# Patient Record
Sex: MALE | Race: WHITE | NOT HISPANIC OR LATINO | URBAN - METROPOLITAN AREA
[De-identification: names, ages, dates, MRNs, and addresses within clinical notes are randomized per-mention and may not be internally consistent; named-entity substitution may affect disease eponyms.]

---

## 2017-03-30 ENCOUNTER — IMPORTED ENCOUNTER (OUTPATIENT)
Dept: URBAN - METROPOLITAN AREA CLINIC 50 | Facility: CLINIC | Age: 77
End: 2017-03-30

## 2017-04-03 ENCOUNTER — IMPORTED ENCOUNTER (OUTPATIENT)
Dept: URBAN - METROPOLITAN AREA CLINIC 50 | Facility: CLINIC | Age: 77
End: 2017-04-03

## 2018-08-20 ENCOUNTER — IMPORTED ENCOUNTER (OUTPATIENT)
Dept: URBAN - METROPOLITAN AREA CLINIC 50 | Facility: CLINIC | Age: 78
End: 2018-08-20

## 2019-08-23 ENCOUNTER — IMPORTED ENCOUNTER (OUTPATIENT)
Dept: URBAN - METROPOLITAN AREA CLINIC 50 | Facility: CLINIC | Age: 79
End: 2019-08-23

## 2020-04-17 ENCOUNTER — IMPORTED ENCOUNTER (OUTPATIENT)
Dept: URBAN - METROPOLITAN AREA CLINIC 50 | Facility: CLINIC | Age: 80
End: 2020-04-17

## 2020-06-01 ENCOUNTER — IMPORTED ENCOUNTER (OUTPATIENT)
Dept: URBAN - METROPOLITAN AREA CLINIC 50 | Facility: CLINIC | Age: 80
End: 2020-06-01

## 2020-06-05 ENCOUNTER — IMPORTED ENCOUNTER (OUTPATIENT)
Dept: URBAN - METROPOLITAN AREA CLINIC 50 | Facility: CLINIC | Age: 80
End: 2020-06-05

## 2020-06-26 ENCOUNTER — IMPORTED ENCOUNTER (OUTPATIENT)
Dept: URBAN - METROPOLITAN AREA CLINIC 50 | Facility: CLINIC | Age: 80
End: 2020-06-26

## 2020-07-01 ENCOUNTER — IMPORTED ENCOUNTER (OUTPATIENT)
Dept: URBAN - METROPOLITAN AREA CLINIC 50 | Facility: CLINIC | Age: 80
End: 2020-07-01

## 2020-07-10 ENCOUNTER — IMPORTED ENCOUNTER (OUTPATIENT)
Dept: URBAN - METROPOLITAN AREA CLINIC 50 | Facility: CLINIC | Age: 80
End: 2020-07-10

## 2020-07-15 ENCOUNTER — IMPORTED ENCOUNTER (OUTPATIENT)
Dept: URBAN - METROPOLITAN AREA CLINIC 50 | Facility: CLINIC | Age: 80
End: 2020-07-15

## 2020-07-15 NOTE — PATIENT DISCUSSION
"""S/P IOL OS: Sensar [id number] 21 +Omidria. Continue post operative instructions and drops per schedule.  """

## 2020-07-24 ENCOUNTER — IMPORTED ENCOUNTER (OUTPATIENT)
Dept: URBAN - METROPOLITAN AREA CLINIC 50 | Facility: CLINIC | Age: 80
End: 2020-07-24

## 2020-08-28 ENCOUNTER — IMPORTED ENCOUNTER (OUTPATIENT)
Dept: URBAN - METROPOLITAN AREA CLINIC 50 | Facility: CLINIC | Age: 80
End: 2020-08-28

## 2020-12-04 ENCOUNTER — IMPORTED ENCOUNTER (OUTPATIENT)
Dept: URBAN - METROPOLITAN AREA CLINIC 50 | Facility: CLINIC | Age: 80
End: 2020-12-04

## 2021-04-18 ASSESSMENT — VISUAL ACUITY
OS_CC: J1+
OD_CC: 20/50
OS_OTHER: 20/40. 20/40.
OD_CC: J1+
OS_OTHER: 20/30. 20/40.
OD_OTHER: 20/60. 20/100.
OS_CC: 20/30
OS_SC: 20/25-
OS_CC: J1
OD_BAT: 20/60
OS_SC: 20/25-
OD_SC: 20/30-+
OS_PH: 20/25
OD_OTHER: 20/70.
OS_OTHER: 20/30.
OD_OTHER: 20/70. 20/400.
OD_CC: 20/20
OS_CC: 20/25-
OD_CC: 20/60
OD_CC: J1+@ 15 IN
OS_BAT: 20/30
OD_BAT: 20/50
OD_SC: 20/40
OS_SC: 20/30+2
OS_CC: 20/30
OD_BAT: 20/70
OD_BAT: 20/60
OS_CC: 20/20
OS_BAT: 20/30
OS_BAT: 20/40
OS_BAT: 20/40
OD_BAT: 20/70
OD_PH: 20/30
OS_OTHER: 20/40. 20/40.
OD_OTHER: 20/50. 20/60.
OD_OTHER: 20/60. 20/100.
OS_CC: 20/20
OD_OTHER: 20/60. 20/100.
OD_CC: 20/60
OD_CC: 20/40-
OD_SC: 20/25-
OS_CC: J1+@ 15 IN
OS_BAT: 20/40
OS_CC: 20/30
OD_CC: 20/40
OD_SC: 20/100
OS_OTHER: 20/40. 20/50.
OS_CC: 20/70
OS_OTHER: 20/30. 20/50.
OD_BAT: 20/60
OS_OTHER: 20/40. 20/50.
OS_BAT: 20/40
OD_PH: 20/40
OD_CC: J1

## 2021-04-18 ASSESSMENT — TONOMETRY
OD_IOP_MMHG: 12
OS_IOP_MMHG: 16
OD_IOP_MMHG: 15
OD_IOP_MMHG: 18
OS_IOP_MMHG: 18
OS_IOP_MMHG: 18
OS_IOP_MMHG: 14
OD_IOP_MMHG: 14
OS_IOP_MMHG: 15
OS_IOP_MMHG: 15
OD_IOP_MMHG: 16
OS_IOP_MMHG: 16
OS_IOP_MMHG: 15
OS_IOP_MMHG: 15
OD_IOP_MMHG: 18
OD_IOP_MMHG: 15
OD_IOP_MMHG: 15
OD_IOP_MMHG: 16
OS_IOP_MMHG: 16
OD_IOP_MMHG: 18

## 2021-12-15 ENCOUNTER — PREPPED CHART (OUTPATIENT)
Dept: URBAN - METROPOLITAN AREA CLINIC 49 | Facility: CLINIC | Age: 81
End: 2021-12-15

## 2021-12-30 ENCOUNTER — COMPREHENSIVE EXAM (OUTPATIENT)
Dept: URBAN - METROPOLITAN AREA CLINIC 49 | Facility: CLINIC | Age: 81
End: 2021-12-30

## 2021-12-30 DIAGNOSIS — H43.813: ICD-10-CM

## 2021-12-30 DIAGNOSIS — H26.493: ICD-10-CM

## 2021-12-30 DIAGNOSIS — H35.373: ICD-10-CM

## 2021-12-30 PROCEDURE — 92012 INTRM OPH EXAM EST PATIENT: CPT

## 2021-12-30 PROCEDURE — 92134 CPTRZ OPH DX IMG PST SGM RTA: CPT

## 2021-12-30 ASSESSMENT — VISUAL ACUITY
OD_GLARE: 20/40
OD_CC: 20/30
OU_CC: J1+
OS_CC: 20/25-2
OS_GLARE: 20/50
OS_GLARE: 20/40
OD_GLARE: 20/50

## 2021-12-30 ASSESSMENT — TONOMETRY
OS_IOP_MMHG: 16
OD_IOP_MMHG: 16

## 2023-11-21 ENCOUNTER — ESTABLISHED PATIENT (OUTPATIENT)
Dept: URBAN - METROPOLITAN AREA CLINIC 49 | Facility: LOCATION | Age: 83
End: 2023-11-21

## 2023-11-21 DIAGNOSIS — H52.4: ICD-10-CM

## 2023-11-21 DIAGNOSIS — H53.2: ICD-10-CM

## 2023-11-21 PROCEDURE — 92060 SENSORIMOTOR EXAMINATION: CPT

## 2023-11-21 PROCEDURE — 92015 DETERMINE REFRACTIVE STATE: CPT

## 2023-11-21 ASSESSMENT — VISUAL ACUITY
OS_CC: 20/25
OD_CC: 20/25

## 2023-11-21 ASSESSMENT — TONOMETRY
OS_IOP_MMHG: 11
OD_IOP_MMHG: 10

## 2024-02-28 ENCOUNTER — COMPREHENSIVE EXAM (OUTPATIENT)
Dept: URBAN - METROPOLITAN AREA CLINIC 49 | Facility: LOCATION | Age: 84
End: 2024-02-28

## 2024-02-28 DIAGNOSIS — H35.033: ICD-10-CM

## 2024-02-28 DIAGNOSIS — H26.493: ICD-10-CM

## 2024-02-28 DIAGNOSIS — H35.373: ICD-10-CM

## 2024-02-28 DIAGNOSIS — H02.834: ICD-10-CM

## 2024-02-28 DIAGNOSIS — H43.813: ICD-10-CM

## 2024-02-28 DIAGNOSIS — H02.831: ICD-10-CM

## 2024-02-28 PROCEDURE — 92134 CPTRZ OPH DX IMG PST SGM RTA: CPT

## 2024-02-28 PROCEDURE — 99214 OFFICE O/P EST MOD 30 MIN: CPT

## 2024-02-28 ASSESSMENT — TONOMETRY
OS_IOP_MMHG: 13
OD_IOP_MMHG: 12

## 2024-02-28 ASSESSMENT — VISUAL ACUITY
OS_CC: 20/20
OS_SC: 20/25
OD_CC: 20/20
OU_SC: 20/20-1
OD_SC: 20/25
OU_CC: J1+
OU_CC: 20/20

## 2025-02-05 ENCOUNTER — COMPREHENSIVE EXAM (OUTPATIENT)
Age: 85
End: 2025-02-05

## 2025-02-05 DIAGNOSIS — H35.033: ICD-10-CM

## 2025-02-05 DIAGNOSIS — H35.373: ICD-10-CM

## 2025-02-05 DIAGNOSIS — H52.4: ICD-10-CM

## 2025-02-05 DIAGNOSIS — H26.493: ICD-10-CM

## 2025-02-05 DIAGNOSIS — H02.831: ICD-10-CM

## 2025-02-05 DIAGNOSIS — H43.813: ICD-10-CM

## 2025-02-05 DIAGNOSIS — H02.834: ICD-10-CM

## 2025-02-05 PROCEDURE — 99214 OFFICE O/P EST MOD 30 MIN: CPT

## 2025-02-05 PROCEDURE — 92134 CPTRZ OPH DX IMG PST SGM RTA: CPT
